# Patient Record
Sex: MALE | Race: OTHER | HISPANIC OR LATINO | URBAN - METROPOLITAN AREA
[De-identification: names, ages, dates, MRNs, and addresses within clinical notes are randomized per-mention and may not be internally consistent; named-entity substitution may affect disease eponyms.]

---

## 2024-08-21 ENCOUNTER — EMERGENCY (EMERGENCY)
Facility: HOSPITAL | Age: 79
LOS: 1 days | Discharge: HOME | End: 2024-08-21
Attending: EMERGENCY MEDICINE
Payer: MEDICARE

## 2024-08-21 VITALS
TEMPERATURE: 98 F | SYSTOLIC BLOOD PRESSURE: 170 MMHG | DIASTOLIC BLOOD PRESSURE: 84 MMHG | RESPIRATION RATE: 17 BRPM | HEART RATE: 86 BPM | OXYGEN SATURATION: 99 %

## 2024-08-21 VITALS
DIASTOLIC BLOOD PRESSURE: 85 MMHG | RESPIRATION RATE: 15 BRPM | TEMPERATURE: 98 F | SYSTOLIC BLOOD PRESSURE: 172 MMHG | HEART RATE: 84 BPM | HEIGHT: 67 IN | WEIGHT: 216.05 LBS | OXYGEN SATURATION: 97 %

## 2024-08-21 PROCEDURE — 72125 CT NECK SPINE W/O DYE: CPT | Mod: MC

## 2024-08-21 PROCEDURE — 93005 ELECTROCARDIOGRAM TRACING: CPT

## 2024-08-21 PROCEDURE — 71046 X-RAY EXAM CHEST 2 VIEWS: CPT

## 2024-08-21 PROCEDURE — 72100 X-RAY EXAM L-S SPINE 2/3 VWS: CPT

## 2024-08-21 PROCEDURE — 71046 X-RAY EXAM CHEST 2 VIEWS: CPT | Mod: 26

## 2024-08-21 PROCEDURE — 72100 X-RAY EXAM L-S SPINE 2/3 VWS: CPT | Mod: 26

## 2024-08-21 PROCEDURE — 99285 EMERGENCY DEPT VISIT HI MDM: CPT

## 2024-08-21 PROCEDURE — 72125 CT NECK SPINE W/O DYE: CPT | Mod: 26,MC

## 2024-08-21 PROCEDURE — 70450 CT HEAD/BRAIN W/O DYE: CPT | Mod: 26,MC

## 2024-08-21 PROCEDURE — 99284 EMERGENCY DEPT VISIT MOD MDM: CPT | Mod: 25

## 2024-08-21 PROCEDURE — 71250 CT THORAX DX C-: CPT | Mod: 26,MC

## 2024-08-21 PROCEDURE — 71250 CT THORAX DX C-: CPT | Mod: MC

## 2024-08-21 PROCEDURE — 70450 CT HEAD/BRAIN W/O DYE: CPT | Mod: MC

## 2024-08-21 RX ORDER — LIDOCAINE 5% 5 G/100G
1 CREAM TOPICAL ONCE
Refills: 0 | Status: COMPLETED | OUTPATIENT
Start: 2024-08-21 | End: 2024-08-21

## 2024-08-21 RX ORDER — METHOCARBAMOL 500 MG
1500 TABLET ORAL ONCE
Refills: 0 | Status: COMPLETED | OUTPATIENT
Start: 2024-08-21 | End: 2024-08-21

## 2024-08-21 RX ORDER — ACETAMINOPHEN 500 MG
975 TABLET ORAL ONCE
Refills: 0 | Status: COMPLETED | OUTPATIENT
Start: 2024-08-21 | End: 2024-08-21

## 2024-08-21 RX ORDER — METHOCARBAMOL 500 MG
2 TABLET ORAL
Qty: 18 | Refills: 0
Start: 2024-08-21 | End: 2024-08-23

## 2024-08-21 RX ADMIN — Medication 1500 MILLIGRAM(S): at 21:59

## 2024-08-21 RX ADMIN — Medication 975 MILLIGRAM(S): at 21:33

## 2024-08-21 RX ADMIN — LIDOCAINE 5% 1 PATCH: 5 CREAM TOPICAL at 14:06

## 2024-08-21 RX ADMIN — Medication 975 MILLIGRAM(S): at 14:06

## 2024-08-21 NOTE — ED PROVIDER NOTE - PHYSICAL EXAMINATION
CONSTITUTIONAL: Patient is awake, alert and oriented x 3. Patient is well appearing and in no acute distress  HEAD: NCAT  NECK: supple, FROM  LUNGS: CTA b/l, no wheezing or rales   HEART: RRR.+S1S2 no murmurs  ABDOMEN: Soft, non-distended, nttp,  no rebound or guarding  EXTREMITY: No edema or calf tenderness b/l, FROM upper and lower ext b/l. There is ttp in the left paraspinal cervical and lumbar spine and left trapezius are and upper left chest wall. No focal bony midline cervical, thoracic or lumbar ttp   SKIN: No rash or lesions  NEURO: Cn3-12 grossly intact. Strength 5/5 UE/LE b/l. Normal gross sensation UE/LE b/l. Gait normal CONSTITUTIONAL: Patient is awake, alert and oriented x 3. Patient is well appearing and in no acute distress  HEAD: NCAT  NECK: supple, FROM  LUNGS: CTA b/l, no wheezing or rales   HEART: RRR.+S1S2 no murmurs  ABDOMEN: Soft, non-distended, nttp,  no rebound or guarding  EXTREMITY: No edema or calf tenderness b/l, FROM upper and lower ext b/l. There is ttp in the left paraspinal cervical and lumbar spine and left trapezius are and upper left chest wall. No focal bony midline cervical, thoracic or lumbar ttp   SKIN: No rash or lesions  NEURO: Cn3-12 grossly intact. Strength 5/5 UE/LE b/l. Normal gross sensation UE/LE b/l. Gait normal      Attn - alert, nad, head - NC/AT, no facial tenderness, mandible and TMJ are NT, tongue - no trauma, PERRL 3 mm, nose - NT, no deformity or signs of epistaxis, neck/spine/back - tender L paraC, paraT&L spine, Chest/ribs - tender left anterior pectoral area, Lungs - clear, good BS bilaterally with mild splinting, Cor - RR, no M, Abdo - soft, NT, ND, no HSM or tenderness, no ecchymosis or signs of trauma, no CVAT, Pelvis/hips - NT, and no pain with AROM.  UExt - Right - ROM without pain, NT, Left -  ROM without pain, NT.  LExt - Right - ROM without pain, NT,  Left -  ROM without pain, NT.  Neuro - CN, motor, sensory, cerebellar, speech intact and non focal.  Skin - intact and without trauma

## 2024-08-21 NOTE — ED ADULT NURSE NOTE - OBJECTIVE STATEMENT
78 yo male with a PMH of presents to the ED ambulatory with steady gait from home complaining of a fall. Patient had a slip and fall in the shower and hit his head. Denies any LOC. On plavix. Patient is otherwise well appearing. Neuro intact. Denies vision changes, chest pain, shortness of breath, abdominal pain, nausea, vomiting, diarrhea, fevers, chills, dysuria, hematuria, recent illness travel.

## 2024-08-21 NOTE — ED PROVIDER NOTE - OBJECTIVE STATEMENT
79-year-old male with history of hypertension, hyperlipidemia, diabetes and CAD status post 1 stent on Plavix presents the ED complaining of neck and back pain after a fall.  Patient reports that he is visiting from [place] and went to shower at his daughter's house today after turning on the water slipped and fell landing on his back.  Reports that he did not lose consciousness and was able to get up on his own.  Since has had pain mostly in the left side neck and upper back rating to the left upper chest as well as the lower back.  Patient reports that he has known herniated disks in his lower back.  He denies loss of consciousness, numbness, tingling, vision changes, change in speech, headaches, dizziness, shortness of breath, abdominal pain nausea or vomiting. 79-year-old male with history of hypertension, hyperlipidemia, diabetes and CAD status post 1 stent on Plavix presents the ED complaining of neck and back pain after a fall.  Patient reports that he is visiting from [place] and went to shower at his daughter's house today after turning on the water slipped and fell landing on his back.  Reports that he did not lose consciousness and was able to get up on his own.  Since has had pain mostly in the left side neck and upper back rating to the left upper chest as well as the lower back.  Patient reports that he has known herniated disks in his lower back.  He denies loss of consciousness, numbness, tingling, vision changes, change in speech, headaches, dizziness, shortness of breath, abdominal pain nausea or vomiting.         Attending note.  Patient was seen in [facility].  Agree with above.  Patient is visiting from [place] when he slipped in the bathroom this morning at approximately 9 AM now complaining of striking his head with mild headache, left-sided neck pain, left anterior chest pain, mid and lower back pain.  Denies any loss of consciousness, nausea, vomiting, numbness or paresthesia.  Pain is worse with movement.  He has past history of CAD with stent and takes aspirin and Plavix daily.  Patient took Tylenol without improvement.  Patient was scheduled to fly back to [place] tomorrow.

## 2024-08-21 NOTE — ED PROVIDER NOTE - NSFOLLOWUPINSTRUCTIONS_ED_ALL_ED_FT
decreased strength
1. Please follow up with your Primary Care Doctor after discharge, bring a copy of your results to follow up appointment for review     2. Please rest, stay hydrated. For continued or recurrent pain recommend taking over the counter Tylenol (acetaminophen) 1000 mg every 6 hours as needed. Additionally recommend applying ice packs to areas of pain for 20 minute intervals several times per day for the next few days. For additional relief of your pain you may use over the counter Lidocaine patches such as Salonpas     3. For muscle spasms take Robaxin 1500mg every 8 hours as needed    4.  We recommend close follow up with your Spine Specialist in [place] after return home for reevaluation and continued management. Recommend bringing a copy of your results to follow up for comparison and review     5. Return to ED for any new or worsened symptoms of concern including increased pain, numbness, weakness and any other concerns

## 2024-08-21 NOTE — ED PROVIDER NOTE - PATIENT PORTAL LINK FT
You can access the FollowMyHealth Patient Portal offered by [facility] by registering at the following website: [email or web address]. By joining [employer]’s FollowMyHealth portal, you will also be able to view your health information using other applications (apps) compatible with our system.

## 2024-08-21 NOTE — ED ADULT TRIAGE NOTE - CHIEF COMPLAINT QUOTE
trip and fall in shower when trying to grab showerhead  hit back of head, denies LOC, on clopidogrel
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
## 2024-08-21 NOTE — ED PROVIDER NOTE - CLINICAL SUMMARY MEDICAL DECISION MAKING FREE TEXT BOX
Attending note.  Patient with slip and fall this morning on wet floor striking his head and back now complains of headache, neck pain, left anterior pectoral chest pain, back pain.  Analgesia, CT head, CT neck, CT chest, x-ray of LS spine, EKG though left precordial chest pain is tender to palpation and likely MSK origin with very low suspicion for ACS.  Reassess.

## 2024-08-21 NOTE — ED ADULT NURSE NOTE - NSFALLHARMRISKINTERV_ED_ALL_ED
